# Patient Record
Sex: FEMALE | Race: WHITE | NOT HISPANIC OR LATINO | Employment: PART TIME | URBAN - METROPOLITAN AREA
[De-identification: names, ages, dates, MRNs, and addresses within clinical notes are randomized per-mention and may not be internally consistent; named-entity substitution may affect disease eponyms.]

---

## 2024-06-18 ENCOUNTER — PATIENT OUTREACH (OUTPATIENT)
Facility: HOSPITAL | Age: 64
End: 2024-06-18

## 2024-06-18 ENCOUNTER — TELEPHONE (OUTPATIENT)
Facility: HOSPITAL | Age: 64
End: 2024-06-18

## 2024-06-18 NOTE — TELEPHONE ENCOUNTER
Telephone Call    [x] Called Patient regarding [facility]; Patient answered call and wants to enroll; Asked for a call back.    [] Called Patient regarding [facility]; Patient answered call; Declined to enroll in program.    [] Called Patient regarding [facility]; Patient did not ; Left voicemail with my name and direct phone number.     [] Called Patient regarding [facility]; Patient did not ; Unable to leave voicemail.    [] Called Patient regarding [facility]; Phone number is invalid    Attempts (Max 3): [x]1   []2   []3        Additional Notes:  PT will call back on lunch break.

## 2024-06-18 NOTE — TELEPHONE ENCOUNTER
Patient called [facility] [place] transferred call to [facility].

## 2024-07-03 ENCOUNTER — HOSPITAL ENCOUNTER (OUTPATIENT)
Dept: RADIOLOGY | Facility: CLINIC | Age: 64
Discharge: HOME | End: 2024-07-03
Payer: OTHER GOVERNMENT

## 2024-07-03 VITALS — WEIGHT: 157 LBS | HEIGHT: 67 IN | BODY MASS INDEX: 24.64 KG/M2

## 2024-07-03 DIAGNOSIS — N64.4 MASTODYNIA: ICD-10-CM

## 2024-07-03 PROCEDURE — 76642 ULTRASOUND BREAST LIMITED: CPT

## 2024-07-03 PROCEDURE — 77066 DX MAMMO INCL CAD BI: CPT

## 2024-07-03 PROCEDURE — G0279 TOMOSYNTHESIS, MAMMO: HCPCS

## 2024-11-17 NOTE — PROGRESS NOTES
"Program details reviewed (Yes/No): YES    Patient lives in [place]: (Yes/No): YES    Uninsured/Underinsured(List): YES    Patient Agreeable to Participation(Yes/No): YES    Verbal Consent Given (Yes/No): YES    [facility] Consent form signed \"verba consent\"(Yes/No): YES    Patient Entered into [facility] (Yes/No): YES     " daughter